# Patient Record
(demographics unavailable — no encounter records)

---

## 2025-03-20 NOTE — DISCUSSION/SUMMARY
[FreeTextEntry1] : 14 year old male presents to clinic for abdominal pain snack given in clinic - pain resolved after eating Discussed with patient to eat smaller meals, eat slowly; avoid fast food, fried food, and fatty foods.  Encouraged patient to increase fluid intake and not to skip meals.  Return to clinic for new or worsening symptoms  [facility] form reviewed - no MH issues/concerns identified

## 2025-03-20 NOTE — RISK ASSESSMENT
[Eats meals with family] : eats meals with family [Has family members/adults to turn to for help] : has family members/adults to turn to for help [Is permitted and is able to make independent decisions] : Is permitted and is able to make independent decisions [Calcium source] : calcium source [Has friends] : has friends [Home is free of violence] : home is free of violence [Uses safety belts/safety equipment] : uses safety belts/safety equipment  [Has peer relationships free of violence] : has peer relationships free of violence [Has ways to cope with stress] : has ways to cope with stress [Displays self-confidence] : displays self-confidence [With Teen] : teen [Has concerns about body or appearance] : does not have concerns about body or appearance [At least 1 hour of physical activity a day] : does not do at least 1 hour of physical activity a day [Screen time (except homework) less than 2 hours a day] : no screen time (except homework) less than 2 hours a day [Has interests/participates in community activities/volunteers] : does not have interests/participates in community activities/volunteers [Uses tobacco] : does not use tobacco [Uses drugs] : does not use drugs  [Drinks alcohol] : does not drink alcohol [Impaired/distracted driving] : no impaired/distracted driving [Has/had oral sex] : has not had oral sex [Has had sexual intercourse] : has not had sexual intercourse [Has problems with sleep] : does not have problems with sleep [Gets depressed, anxious, or irritable/has mood swings] : does not get depressed, anxious, or irritable/has mood swings [Has thought about hurting self or considered suicide] : has not thought about hurting self or considered suicide [de-identified] : lives with grandmother [de-identified] : 9th grade [employer] failing math [de-identified] : tried a vape pen few years ago [de-identified] : virgin, interested in females only but no current girlfriend [de-identified] : junior with stress by playing video games

## 2025-03-20 NOTE — REVIEW OF SYSTEMS
[Abdominal Pain] : abdominal pain [Negative] : Constitutional [PO Intolerance] : PO tolerance [Vomiting] : no vomiting [Diarrhea] : no diarrhea [Constipation] : no constipation

## 2025-03-20 NOTE — HISTORY OF PRESENT ILLNESS
[FreeTextEntry6] : 14 year old male presents to clinic with c/o abdominal pain since 3rd period states he did not eat breakfast today c/o generalize abdominal pain 5/10